# Patient Record
Sex: MALE
[De-identification: names, ages, dates, MRNs, and addresses within clinical notes are randomized per-mention and may not be internally consistent; named-entity substitution may affect disease eponyms.]

---

## 2018-07-02 ENCOUNTER — TRANSCRIPTION ENCOUNTER (OUTPATIENT)
Age: 43
End: 2018-07-02

## 2018-07-06 ENCOUNTER — OTHER (OUTPATIENT)
Age: 43
End: 2018-07-06

## 2018-07-06 PROBLEM — Z00.00 ENCOUNTER FOR PREVENTIVE HEALTH EXAMINATION: Status: ACTIVE | Noted: 2018-07-06

## 2018-07-09 ENCOUNTER — APPOINTMENT (OUTPATIENT)
Dept: ORTHOPEDIC SURGERY | Facility: CLINIC | Age: 43
End: 2018-07-09
Payer: COMMERCIAL

## 2018-07-09 VITALS
HEIGHT: 65 IN | HEART RATE: 102 BPM | WEIGHT: 165 LBS | SYSTOLIC BLOOD PRESSURE: 148 MMHG | BODY MASS INDEX: 27.49 KG/M2 | DIASTOLIC BLOOD PRESSURE: 98 MMHG

## 2018-07-09 DIAGNOSIS — Z78.9 OTHER SPECIFIED HEALTH STATUS: ICD-10-CM

## 2018-07-09 DIAGNOSIS — S82.822A TORUS FRACTURE OF LOWER END OF LEFT FIBULA, INITIAL ENCOUNTER FOR CLOSED FRACTURE: ICD-10-CM

## 2018-07-09 DIAGNOSIS — M25.572 PAIN IN LEFT ANKLE AND JOINTS OF LEFT FOOT: ICD-10-CM

## 2018-07-09 PROCEDURE — 99204 OFFICE O/P NEW MOD 45 MIN: CPT | Mod: 57

## 2018-07-09 PROCEDURE — 27786 TREATMENT OF ANKLE FRACTURE: CPT | Mod: LT

## 2018-07-09 PROCEDURE — 73610 X-RAY EXAM OF ANKLE: CPT | Mod: LT

## 2018-08-02 PROBLEM — S82.822D CLOSED TORUS FRACTURE OF DISTAL END OF LEFT FIBULA WITH ROUTINE HEALING, SUBSEQUENT ENCOUNTER: Status: ACTIVE | Noted: 2018-08-02

## 2018-08-07 ENCOUNTER — APPOINTMENT (OUTPATIENT)
Dept: ORTHOPEDIC SURGERY | Facility: CLINIC | Age: 43
End: 2018-08-07
Payer: COMMERCIAL

## 2018-08-07 VITALS
HEART RATE: 89 BPM | SYSTOLIC BLOOD PRESSURE: 145 MMHG | TEMPERATURE: 99.8 F | BODY MASS INDEX: 26.52 KG/M2 | DIASTOLIC BLOOD PRESSURE: 103 MMHG | HEIGHT: 66 IN | WEIGHT: 165 LBS

## 2018-08-07 DIAGNOSIS — S82.822D TORUS FRACTURE OF LOWER END OF LEFT FIBULA, SUBSEQUENT ENCOUNTER FOR FRACTURE WITH ROUTINE HEALING: ICD-10-CM

## 2018-08-07 PROCEDURE — 29515 APPLICATION SHORT LEG SPLINT: CPT | Mod: 58,LT

## 2018-08-07 PROCEDURE — 73610 X-RAY EXAM OF ANKLE: CPT | Mod: LT

## 2018-08-07 PROCEDURE — 99024 POSTOP FOLLOW-UP VISIT: CPT

## 2024-06-11 ENCOUNTER — APPOINTMENT (OUTPATIENT)
Dept: ORTHOPEDIC SURGERY | Facility: CLINIC | Age: 49
End: 2024-06-11
Payer: MEDICAID

## 2024-06-11 VITALS
HEART RATE: 66 BPM | DIASTOLIC BLOOD PRESSURE: 112 MMHG | BODY MASS INDEX: 27.32 KG/M2 | SYSTOLIC BLOOD PRESSURE: 154 MMHG | WEIGHT: 170 LBS | HEIGHT: 66 IN

## 2024-06-11 DIAGNOSIS — M79.644 PAIN IN RIGHT FINGER(S): ICD-10-CM

## 2024-06-11 DIAGNOSIS — S62.309A UNSPECIFIED FRACTURE OF UNSPECIFIED METACARPAL BONE, INITIAL ENCOUNTER FOR CLOSED FRACTURE: ICD-10-CM

## 2024-06-11 PROCEDURE — 99204 OFFICE O/P NEW MOD 45 MIN: CPT

## 2024-06-11 PROCEDURE — 73140 X-RAY EXAM OF FINGER(S): CPT | Mod: F5

## 2024-06-11 NOTE — ASSESSMENT
[FreeTextEntry1] : ASSESSMENT: The patient comes in today with acute right thumb pain after suffering an injury on 5/22/24? complicated by a right first metacarpal base fracture with displacement. He subsequently presented to [facility] ER on 5/31/2024. Symptoms are bothersome and are affecting his ADLs. There is some question regarding the timeline of his injury as there are already excellent signs of callus formation on imaging today.  Clinical examination and imaging are reassuring however.  At this point in time he will continue proper thumb spica splinting, nonweightbearing, activity modifications, anti-inflammatory medication as needed.  Risks of chronic deformity, chronic pain, nonunion discussed with the patient, patient verbalizes understanding.   The patient was adequately and thoroughly informed of my assessment of their current condition(s).  - This may diminish bodily function for the extremity.  We discussed prognosis, treatment modalities including operative and nonoperative options for the above diagnostic assessment. As always, 2nd opinion is always provided as an option. For this, when accessible, I was able to review other physicians note(s) including reviewing other tests, imaging results as well as personally view these results for my own interpretation.      The patient was adequately and thoroughly informed of my assessment of their current condition(s).   DISCUSSION: 1.  Continue thumb spica splint wear.  Activity modifications.  Nonweightbearing. Anti-inflammatory medication PRN. 2.  Follow-up in 1 week for repeat x-rays of the right thumb. 3. [x]

## 2024-06-11 NOTE — HISTORY OF PRESENT ILLNESS
[FreeTextEntry1] : Elida is a 48-year-old male who presents today with acute right thumb pain after suffering an injury on 5/22/24.  He subsequently presented to [facility] on 5/31/2024. Symptoms are bothersome and are affecting his ADLs.

## 2024-06-11 NOTE — PHYSICAL EXAM
[de-identified] : Examination of the [right] hand reveals swelling bruising and ecchymosis.   There is tenderness at the level of the [first] metacarpal.   [I do not see any rotational abnormalities] [de-identified] : [3] views of right thumb were obtained today in my office and were seen by me and discussed with the patient.  These [show findings consistent with right first metacarpal base fracture displaced with excellent signs of callus formation]

## 2024-07-25 ENCOUNTER — APPOINTMENT (OUTPATIENT)
Dept: ORTHOPEDIC SURGERY | Facility: CLINIC | Age: 49
End: 2024-07-25